# Patient Record
Sex: MALE | Race: WHITE | NOT HISPANIC OR LATINO | URBAN - NONMETROPOLITAN AREA
[De-identification: names, ages, dates, MRNs, and addresses within clinical notes are randomized per-mention and may not be internally consistent; named-entity substitution may affect disease eponyms.]

---

## 2023-05-15 ENCOUNTER — APPOINTMENT (OUTPATIENT)
Dept: URBAN - NONMETROPOLITAN AREA CLINIC 49 | Age: 83
Setting detail: DERMATOLOGY
End: 2023-05-22

## 2023-05-15 VITALS — SYSTOLIC BLOOD PRESSURE: 167 MMHG | TEMPERATURE: 98.3 F | DIASTOLIC BLOOD PRESSURE: 90 MMHG | HEART RATE: 57 BPM

## 2023-05-15 VITALS — HEART RATE: 54 BPM | SYSTOLIC BLOOD PRESSURE: 179 MMHG | DIASTOLIC BLOOD PRESSURE: 89 MMHG | TEMPERATURE: 97.9 F

## 2023-05-15 PROBLEM — D03.62 MELANOMA IN SITU OF LEFT UPPER LIMB, INCLUDING SHOULDER: Status: ACTIVE | Noted: 2023-05-15

## 2023-05-15 PROCEDURE — 12034 INTMD RPR S/TR/EXT 7.6-12.5: CPT

## 2023-05-15 PROCEDURE — 88342 IMHCHEM/IMCYTCHM 1ST ANTB: CPT | Mod: 59

## 2023-05-15 PROCEDURE — 17313 MOHS 1 STAGE T/A/L: CPT

## 2023-05-15 PROCEDURE — OTHER MOHS SURGERY: OTHER

## 2023-05-15 NOTE — HPI: PROCEDURE (MOHS)
Has The Growth Been Previously Biopsied?: has been previously biopsied
Body Location Override (Optional): left proximal forearm

## 2023-06-13 ENCOUNTER — APPOINTMENT (OUTPATIENT)
Dept: URBAN - NONMETROPOLITAN AREA CLINIC 49 | Age: 83
Setting detail: DERMATOLOGY
End: 2023-06-28

## 2023-06-13 VITALS — SYSTOLIC BLOOD PRESSURE: 142 MMHG | DIASTOLIC BLOOD PRESSURE: 67 MMHG | HEART RATE: 67 BPM

## 2023-06-13 VITALS — DIASTOLIC BLOOD PRESSURE: 86 MMHG | TEMPERATURE: 98.6 F | HEART RATE: 68 BPM | SYSTOLIC BLOOD PRESSURE: 162 MMHG

## 2023-06-13 PROBLEM — D48.5 NEOPLASM OF UNCERTAIN BEHAVIOR OF SKIN: Status: ACTIVE | Noted: 2023-06-13

## 2023-06-13 PROBLEM — C44.319 BASAL CELL CARCINOMA OF SKIN OF OTHER PARTS OF FACE: Status: ACTIVE | Noted: 2023-06-13

## 2023-06-13 PROCEDURE — 13132 CMPLX RPR F/C/C/M/N/AX/G/H/F: CPT | Mod: 59

## 2023-06-13 PROCEDURE — 11102 TANGNTL BX SKIN SINGLE LES: CPT | Mod: 59

## 2023-06-13 PROCEDURE — 17311 MOHS 1 STAGE H/N/HF/G: CPT

## 2023-06-13 PROCEDURE — OTHER MOHS SURGERY: OTHER

## 2023-06-13 PROCEDURE — 17312 MOHS ADDL STAGE: CPT

## 2023-06-13 PROCEDURE — OTHER BIOPSY BY SHAVE METHOD: OTHER

## 2023-06-13 NOTE — PROCEDURE: MOHS SURGERY
Anesthesia Type: 1% lidocaine with 1:100,000 epinephrine and 408mcg clindamycin/ml and a 1:3 solution of 8.4% sodium bicarbonate

## 2023-06-13 NOTE — PROCEDURE: MOHS SURGERY
good balance Rotation Flap Text: The defect edges were debeveled with a #15 scalpel blade.  Given the location of the defect, shape of the defect and the proximity to free margins a rotation flap was deemed most appropriate.  Using a sterile surgical marker, an appropriate rotation flap was drawn incorporating the defect and placing the expected incisions within the relaxed skin tension lines where possible.    The area thus outlined was incised deep to adipose tissue with a #15 scalpel blade.  The skin margins were undermined to an appropriate distance in all directions utilizing iris scissors.

## 2023-06-22 ENCOUNTER — APPOINTMENT (OUTPATIENT)
Dept: URBAN - NONMETROPOLITAN AREA CLINIC 49 | Age: 83
Setting detail: DERMATOLOGY
End: 2023-07-02

## 2023-06-22 DIAGNOSIS — Z48.02 ENCOUNTER FOR REMOVAL OF SUTURES: ICD-10-CM

## 2023-06-22 PROBLEM — C44.91 BASAL CELL CARCINOMA OF SKIN, UNSPECIFIED: Status: ACTIVE | Noted: 2023-06-22

## 2023-06-22 PROCEDURE — OTHER RECOMMENDATIONS: OTHER

## 2023-06-22 NOTE — PROCEDURE: RECOMMENDATIONS
Recommendation Preamble: The following recommendations were made during the visit: Patient advised that this should resolve on it’s own, and that he may use warm compresses to the area 2-3 times daily, if desired.
Render Risk Assessment In Note?: yes
Detail Level: Zone
Recommendation Preamble: The following recommendations were made during the visit: Mohs scheduled.

## 2023-08-02 ENCOUNTER — APPOINTMENT (OUTPATIENT)
Dept: URBAN - NONMETROPOLITAN AREA CLINIC 49 | Age: 83
Setting detail: DERMATOLOGY
End: 2023-08-10

## 2023-08-02 VITALS — HEART RATE: 58 BPM | TEMPERATURE: 98.4 F | SYSTOLIC BLOOD PRESSURE: 144 MMHG | DIASTOLIC BLOOD PRESSURE: 84 MMHG

## 2023-08-02 VITALS — SYSTOLIC BLOOD PRESSURE: 183 MMHG | HEART RATE: 60 BPM | DIASTOLIC BLOOD PRESSURE: 85 MMHG

## 2023-08-02 PROBLEM — D48.5 NEOPLASM OF UNCERTAIN BEHAVIOR OF SKIN: Status: ACTIVE | Noted: 2023-08-02

## 2023-08-02 PROBLEM — C44.311 BASAL CELL CARCINOMA OF SKIN OF NOSE: Status: ACTIVE | Noted: 2023-08-02

## 2023-08-02 PROCEDURE — OTHER MOHS SURGERY: OTHER

## 2023-08-02 PROCEDURE — 15260 FTH/GFT FR N/E/E/L 20 SQCM/<: CPT

## 2023-08-02 PROCEDURE — OTHER BIOPSY BY SHAVE METHOD: OTHER

## 2023-08-02 PROCEDURE — 17312 MOHS ADDL STAGE: CPT

## 2023-08-02 PROCEDURE — 11102 TANGNTL BX SKIN SINGLE LES: CPT | Mod: 59

## 2023-08-02 PROCEDURE — 17311 MOHS 1 STAGE H/N/HF/G: CPT

## 2023-08-02 NOTE — PROCEDURE: MOHS SURGERY
For Release of Medical Records Please Contact:     [address] [facility] Records    [phone] (Monday through Friday: 8 am - 4:30 pm)    [phone] (fax)    Email: [email or web address] Adjacent Tissue Transfer Text: The defect edges were debeveled with a #15 scalpel blade.  Given the location of the defect and the proximity to free margins an adjacent tissue transfer was deemed most appropriate.  Using a sterile surgical marker, an appropriate flap was drawn incorporating the defect and placing the expected incisions within the relaxed skin tension lines where possible.    The area thus outlined was incised deep to adipose tissue with a #15 scalpel blade.  The skin margins were undermined to an appropriate distance in all directions utilizing iris scissors.

## 2023-08-08 NOTE — PROCEDURE: MOHS SURGERY
Closure 2 Information: This tab is for additional flaps and grafts, including complex repair and grafts and complex repair and flaps. You can also specify a different location for the additional defect, if the location is the same you do not need to select a new one. We will insert the automated text for the repair you select below just as we do for solitary flaps and grafts. Please note that at this time if you select a location with a different insurance zone you will need to override the ICD10 and CPT if appropriate. Rituxan Pregnancy And Lactation Text: This medication is Pregnancy Category C and it isn't know if it is safe during pregnancy. It is unknown if this medication is excreted in breast milk but similar antibodies are known to be excreted.

## 2023-08-09 ENCOUNTER — APPOINTMENT (OUTPATIENT)
Dept: URBAN - NONMETROPOLITAN AREA CLINIC 49 | Age: 83
Setting detail: DERMATOLOGY
End: 2023-08-09

## 2023-08-09 DIAGNOSIS — Z48.817 ENCOUNTER FOR SURGICAL AFTERCARE FOLLOWING SURGERY ON THE SKIN AND SUBCUTANEOUS TISSUE: ICD-10-CM

## 2023-08-09 PROCEDURE — OTHER POST-OP WOUND EVALUATION: OTHER

## 2023-08-09 ASSESSMENT — LOCATION DETAILED DESCRIPTION DERM: LOCATION DETAILED: LEFT NASAL ALA

## 2023-08-09 ASSESSMENT — LOCATION ZONE DERM: LOCATION ZONE: NOSE

## 2023-08-09 ASSESSMENT — LOCATION SIMPLE DESCRIPTION DERM: LOCATION SIMPLE: LEFT NOSE

## 2023-08-09 NOTE — PROCEDURE: POST-OP WOUND EVALUATION
Detail Level: Detailed
Add 67101 Cpt? (Important Note: In 2017 The Use Of 07133 Is Being Tracked By Cms To Determine Future Global Period Reimbursement For Global Periods): no
Wound Evaluated By (Optional): Sebastián Lara MD
Wound Diameter In Cm(Optional): 0
Wound Crusting?: crusted
Patient To Follow-Up With?: our clinic
Follow Up Provider (Optional): Sebastián Lara MD
Follow Up Units (Optional): 3
Follow Up Time Frame (Optional): weeks

## 2023-08-18 ENCOUNTER — APPOINTMENT (OUTPATIENT)
Dept: URBAN - NONMETROPOLITAN AREA CLINIC 49 | Age: 83
Setting detail: DERMATOLOGY
End: 2023-08-28

## 2023-08-18 DIAGNOSIS — Z48.817 ENCOUNTER FOR SURGICAL AFTERCARE FOLLOWING SURGERY ON THE SKIN AND SUBCUTANEOUS TISSUE: ICD-10-CM

## 2023-08-18 PROCEDURE — OTHER POST-OP WOUND EVALUATION: OTHER

## 2023-08-18 ASSESSMENT — LOCATION DETAILED DESCRIPTION DERM: LOCATION DETAILED: LEFT NASAL ALA

## 2023-08-18 ASSESSMENT — LOCATION ZONE DERM: LOCATION ZONE: NOSE

## 2023-08-18 ASSESSMENT — LOCATION SIMPLE DESCRIPTION DERM: LOCATION SIMPLE: LEFT NOSE

## 2023-08-18 NOTE — PROCEDURE: POST-OP WOUND EVALUATION
Detail Level: Detailed
Add 85517 Cpt? (Important Note: In 2017 The Use Of 74523 Is Being Tracked By Cms To Determine Future Global Period Reimbursement For Global Periods): no
Wound Evaluated By (Optional): Sebastián Mujica MD
Wound Diameter In Cm(Optional): 0
Wound Crusting?: crusted
Wound Color?: pink
Patient To Follow-Up With?: our clinic
Follow Up Provider (Optional): Sebastián Mujica MD
Follow Up Units (Optional): 2
Follow Up Time Frame (Optional): weeks

## 2023-08-30 ENCOUNTER — APPOINTMENT (OUTPATIENT)
Dept: URBAN - NONMETROPOLITAN AREA CLINIC 49 | Age: 83
Setting detail: DERMATOLOGY
End: 2023-09-02

## 2023-08-30 VITALS — TEMPERATURE: 98.2 F | SYSTOLIC BLOOD PRESSURE: 162 MMHG | DIASTOLIC BLOOD PRESSURE: 84 MMHG | HEART RATE: 65 BPM

## 2023-08-30 VITALS — HEART RATE: 61 BPM | DIASTOLIC BLOOD PRESSURE: 90 MMHG | SYSTOLIC BLOOD PRESSURE: 156 MMHG

## 2023-08-30 PROBLEM — C44.319 BASAL CELL CARCINOMA OF SKIN OF OTHER PARTS OF FACE: Status: ACTIVE | Noted: 2023-08-30

## 2023-08-30 PROCEDURE — 17311 MOHS 1 STAGE H/N/HF/G: CPT | Mod: 79

## 2023-08-30 PROCEDURE — 17312 MOHS ADDL STAGE: CPT | Mod: 79

## 2023-08-30 PROCEDURE — 13132 CMPLX RPR F/C/C/M/N/AX/G/H/F: CPT | Mod: 79

## 2023-08-30 PROCEDURE — OTHER MOHS SURGERY: OTHER

## 2023-08-30 NOTE — PROCEDURE: MOHS SURGERY
Transfuse 2 units PRBCs.   -Monitor hemoglobin and hematocrit.      Stage 2: Additional Anesthesia Volume In Cc: 3.0

## 2023-08-30 NOTE — PROCEDURE: MOHS SURGERY
Complex Repair And Graft Additional Text (Will Appearing After The Standard Complex Repair Text): The complex repair was not sufficient to completely close the primary defect. The remaining additional defect was repaired with the graft mentioned below. Alternatives Discussed Intro (Do Not Add Period): I discussed alternative treatments to Mohs surgery and specifically discussed the risks and benefits of

## 2023-09-20 NOTE — PROCEDURE: MOHS SURGERY
Refill request for warfarin.  Last seen 12/20/22;  Last filled 5/9/23.  Refilled per protocol.       Retention Suture Text: Retention sutures were placed to support the closure and prevent dehiscence.

## 2024-04-04 NOTE — PROCEDURE: MOHS SURGERY
Detail Level: Detailed General Sunscreen Counseling: I recommended a broad spectrum sunscreen with a SPF of 30 or higher.  I explained that SPF 30 sunscreens block approximately 97 percent of the sun's harmful rays.  Sunscreens should be applied at least 15 minutes prior to expected sun exposure and then every 2 hours after that as long as sun exposure continues. If swimming or exercising sunscreen should be reapplied every 45 minutes to an hour after getting wet or sweating.  One ounce, or the equivalent of a shot glass full of sunscreen, is adequate to protect the skin not covered by a bathing suit. I also recommended a lip balm with a sunscreen as well. Sun protective clothing can be used in lieu of sunscreen but must be worn the entire time you are exposed to the sun's rays. A-T Advancement Flap Text: The defect edges were debeveled with a #15 scalpel blade.  Given the location of the defect, shape of the defect and the proximity to free margins an A-T advancement flap was deemed most appropriate.  Using a sterile surgical marker, an appropriate advancement flap was drawn incorporating the defect and placing the expected incisions within the relaxed skin tension lines where possible.    The area thus outlined was incised deep to adipose tissue with a #15 scalpel blade.  The skin margins were undermined to an appropriate distance in all directions utilizing iris scissors.

## 2024-09-10 NOTE — PROCEDURE: MOHS SURGERY
[___ Month(s)] : in [unfilled] month(s) Oculoplastic Surgeon Procedure Text (A): After obtaining clear surgical margins the patient was sent to oculoplastics for surgical repair.  The patient understands they will receive post-surgical care and follow-up from the referring physician's office.

## 2025-03-12 NOTE — PROCEDURE: MOHS SURGERY
well developed, well nourished , in no acute distress , ambulating without difficulty , normal communication ability Helical Rim Text: The closure involved the helical rim.